# Patient Record
Sex: FEMALE | Race: WHITE | NOT HISPANIC OR LATINO | URBAN - METROPOLITAN AREA
[De-identification: names, ages, dates, MRNs, and addresses within clinical notes are randomized per-mention and may not be internally consistent; named-entity substitution may affect disease eponyms.]

---

## 2020-10-02 ENCOUNTER — AMBULATORY SURGICAL CENTER (OUTPATIENT)
Dept: URBAN - METROPOLITAN AREA SURGERY 2 | Facility: SURGERY | Age: 65
End: 2020-10-02

## 2020-10-02 ENCOUNTER — AMBULATORY SURGICAL CENTER (OUTPATIENT)
Dept: URBAN - METROPOLITAN AREA SURGERY 2 | Facility: SURGERY | Age: 65
End: 2020-10-02
Payer: COMMERCIAL

## 2020-10-02 VITALS
HEART RATE: 55 BPM | HEART RATE: 61 BPM | DIASTOLIC BLOOD PRESSURE: 63 MMHG | SYSTOLIC BLOOD PRESSURE: 116 MMHG | HEART RATE: 53 BPM | TEMPERATURE: 98.5 F | HEART RATE: 55 BPM | TEMPERATURE: 98.5 F | HEART RATE: 61 BPM | SYSTOLIC BLOOD PRESSURE: 123 MMHG | DIASTOLIC BLOOD PRESSURE: 57 MMHG | HEART RATE: 55 BPM | SYSTOLIC BLOOD PRESSURE: 116 MMHG | OXYGEN SATURATION: 95 % | DIASTOLIC BLOOD PRESSURE: 61 MMHG | SYSTOLIC BLOOD PRESSURE: 116 MMHG | DIASTOLIC BLOOD PRESSURE: 49 MMHG | OXYGEN SATURATION: 96 % | DIASTOLIC BLOOD PRESSURE: 73 MMHG | DIASTOLIC BLOOD PRESSURE: 73 MMHG | HEIGHT: 65 IN | OXYGEN SATURATION: 95 % | DIASTOLIC BLOOD PRESSURE: 57 MMHG | OXYGEN SATURATION: 96 % | OXYGEN SATURATION: 95 % | DIASTOLIC BLOOD PRESSURE: 63 MMHG | SYSTOLIC BLOOD PRESSURE: 151 MMHG | TEMPERATURE: 98.5 F | RESPIRATION RATE: 18 BRPM | WEIGHT: 230 LBS | SYSTOLIC BLOOD PRESSURE: 123 MMHG | SYSTOLIC BLOOD PRESSURE: 151 MMHG | SYSTOLIC BLOOD PRESSURE: 123 MMHG | TEMPERATURE: 98.2 F | DIASTOLIC BLOOD PRESSURE: 61 MMHG | RESPIRATION RATE: 18 BRPM | HEIGHT: 65 IN | DIASTOLIC BLOOD PRESSURE: 73 MMHG | WEIGHT: 230 LBS | OXYGEN SATURATION: 98 % | HEART RATE: 53 BPM | DIASTOLIC BLOOD PRESSURE: 61 MMHG | HEART RATE: 53 BPM | SYSTOLIC BLOOD PRESSURE: 121 MMHG | OXYGEN SATURATION: 98 % | HEIGHT: 65 IN | HEART RATE: 56 BPM | HEART RATE: 56 BPM | OXYGEN SATURATION: 96 % | SYSTOLIC BLOOD PRESSURE: 151 MMHG | WEIGHT: 230 LBS | TEMPERATURE: 98.2 F | HEART RATE: 61 BPM | DIASTOLIC BLOOD PRESSURE: 49 MMHG | HEART RATE: 56 BPM | DIASTOLIC BLOOD PRESSURE: 63 MMHG | RESPIRATION RATE: 18 BRPM | TEMPERATURE: 98.2 F | DIASTOLIC BLOOD PRESSURE: 49 MMHG | OXYGEN SATURATION: 98 % | DIASTOLIC BLOOD PRESSURE: 57 MMHG | SYSTOLIC BLOOD PRESSURE: 121 MMHG | SYSTOLIC BLOOD PRESSURE: 121 MMHG

## 2020-10-02 DIAGNOSIS — Z80.0 FAMILY HISTORY OF MALIGNANT NEOPLASM OF DIGESTIVE ORGANS: ICD-10-CM

## 2020-10-02 DIAGNOSIS — Z12.11 ENCOUNTER FOR SCREENING FOR MALIGNANT NEOPLASM OF COLON: ICD-10-CM

## 2020-10-02 DIAGNOSIS — K64.2 THIRD DEGREE HEMORRHOIDS: ICD-10-CM

## 2020-10-02 PROCEDURE — G0105 COLORECTAL SCRN; HI RISK IND: HCPCS | Performed by: INTERNAL MEDICINE

## 2020-10-26 PROBLEM — Z12.11 SCREENING FOR COLONIC NEOPLASIA: Status: ACTIVE | Noted: 2020-10-02

## 2022-04-15 ENCOUNTER — OFFICE (OUTPATIENT)
Dept: URBAN - METROPOLITAN AREA CLINIC 8 | Facility: CLINIC | Age: 67
End: 2022-04-15

## 2022-04-15 VITALS
SYSTOLIC BLOOD PRESSURE: 135 MMHG | HEIGHT: 65 IN | DIASTOLIC BLOOD PRESSURE: 65 MMHG | HEART RATE: 78 BPM | WEIGHT: 239 LBS | OXYGEN SATURATION: 97 %

## 2022-04-15 DIAGNOSIS — R14.0 ABDOMINAL DISTENSION (GASEOUS): ICD-10-CM

## 2022-04-15 DIAGNOSIS — K63.89 OTHER SPECIFIED DISEASES OF INTESTINE: ICD-10-CM

## 2022-04-15 DIAGNOSIS — R10.13 EPIGASTRIC PAIN: ICD-10-CM

## 2022-04-15 PROCEDURE — 99213 OFFICE O/P EST LOW 20 MIN: CPT | Performed by: INTERNAL MEDICINE

## 2022-04-15 NOTE — SERVICEHPINOTES
The patient returns for a follow up of   bloating and gassy  .   The patient was last seen on   10/2/2020  .   Since then symptoms have   worsened   (  )  .   Treatment is   .   Treatment side effects include   .

## 2023-04-21 ENCOUNTER — OFFICE (OUTPATIENT)
Dept: URBAN - METROPOLITAN AREA CLINIC 8 | Facility: CLINIC | Age: 68
End: 2023-04-21
Payer: COMMERCIAL

## 2023-04-21 VITALS
DIASTOLIC BLOOD PRESSURE: 48 MMHG | SYSTOLIC BLOOD PRESSURE: 134 MMHG | HEIGHT: 65 IN | HEART RATE: 69 BPM | WEIGHT: 226 LBS

## 2023-04-21 DIAGNOSIS — R19.7 DIARRHEA, UNSPECIFIED: ICD-10-CM

## 2023-04-21 DIAGNOSIS — R14.0 ABDOMINAL DISTENSION (GASEOUS): ICD-10-CM

## 2023-04-21 DIAGNOSIS — K57.30 DIVERTICULOSIS OF LARGE INTESTINE WITHOUT PERFORATION OR ABS: ICD-10-CM

## 2023-04-21 PROCEDURE — 99213 OFFICE O/P EST LOW 20 MIN: CPT | Performed by: INTERNAL MEDICINE

## 2023-04-21 RX ORDER — MESALAMINE 1.2 G/1
2.4 TABLET, DELAYED RELEASE ORAL
Qty: 60 | Refills: 10 | Status: ACTIVE

## 2023-04-21 RX ORDER — CIPROFLOXACIN HYDROCHLORIDE 500 MG/1
TABLET, FILM COATED ORAL
Qty: 10 | Refills: 0 | Status: COMPLETED
Start: 2023-04-21 | End: 2024-07-25

## 2024-07-25 ENCOUNTER — OFFICE (OUTPATIENT)
Dept: URBAN - METROPOLITAN AREA CLINIC 9 | Facility: CLINIC | Age: 69
End: 2024-07-25
Payer: COMMERCIAL

## 2024-07-25 VITALS
HEART RATE: 75 BPM | HEIGHT: 65 IN | WEIGHT: 209 LBS | DIASTOLIC BLOOD PRESSURE: 62 MMHG | SYSTOLIC BLOOD PRESSURE: 139 MMHG

## 2024-07-25 DIAGNOSIS — R10.13 EPIGASTRIC PAIN: ICD-10-CM

## 2024-07-25 DIAGNOSIS — K21.9 GASTRO-ESOPHAGEAL REFLUX DISEASE WITHOUT ESOPHAGITIS: ICD-10-CM

## 2024-07-25 PROCEDURE — 99213 OFFICE O/P EST LOW 20 MIN: CPT

## 2024-07-26 LAB
HEPATIC FUNCTION PANEL (7): ALBUMIN: 4.3 G/DL (ref 3.9–4.9)
HEPATIC FUNCTION PANEL (7): ALKALINE PHOSPHATASE: 104 IU/L (ref 44–121)
HEPATIC FUNCTION PANEL (7): ALT (SGPT): 14 IU/L (ref 0–32)
HEPATIC FUNCTION PANEL (7): AST (SGOT): 17 IU/L (ref 0–40)
HEPATIC FUNCTION PANEL (7): BILIRUBIN, DIRECT: 0.17 MG/DL (ref 0–0.4)
HEPATIC FUNCTION PANEL (7): BILIRUBIN, TOTAL: 0.6 MG/DL (ref 0–1.2)
HEPATIC FUNCTION PANEL (7): PROTEIN, TOTAL: 6.7 G/DL (ref 6–8.5)
LIPASE: 42 U/L (ref 14–72)

## 2025-03-31 ENCOUNTER — OFFICE (OUTPATIENT)
Dept: URBAN - METROPOLITAN AREA CLINIC 9 | Facility: CLINIC | Age: 70
End: 2025-03-31
Payer: MEDICARE

## 2025-03-31 DIAGNOSIS — K21.9 GASTRO-ESOPHAGEAL REFLUX DISEASE WITHOUT ESOPHAGITIS: ICD-10-CM

## 2025-03-31 PROCEDURE — 99426 PRIN CARE MGMT STAFF 1ST 30: CPT
